# Patient Record
Sex: FEMALE | URBAN - METROPOLITAN AREA
[De-identification: names, ages, dates, MRNs, and addresses within clinical notes are randomized per-mention and may not be internally consistent; named-entity substitution may affect disease eponyms.]

---

## 2020-10-09 ENCOUNTER — APPOINTMENT (RX ONLY)
Dept: URBAN - METROPOLITAN AREA CLINIC 74 | Facility: CLINIC | Age: 50
Setting detail: DERMATOLOGY
End: 2020-10-09

## 2020-10-09 VITALS — TEMPERATURE: 97.6 F

## 2020-10-09 DIAGNOSIS — L68.0 HIRSUTISM: ICD-10-CM

## 2020-10-09 PROCEDURE — ? PRESCRIPTION

## 2020-10-09 PROCEDURE — ? TREATMENT REGIMEN

## 2020-10-09 PROCEDURE — 99202 OFFICE O/P NEW SF 15 MIN: CPT

## 2020-10-09 PROCEDURE — ? PATIENT SPECIFIC COUNSELING

## 2020-10-09 PROCEDURE — ? COUNSELING

## 2020-10-09 RX ORDER — EFLORNITHINE HYDROCHLORIDE 139 MG/G
CREAM TOPICAL BID
Qty: 1 | Refills: 3 | Status: ERX

## 2020-10-09 ASSESSMENT — LOCATION SIMPLE DESCRIPTION DERM
LOCATION SIMPLE: LEFT CHEEK
LOCATION SIMPLE: NECK
LOCATION SIMPLE: SUBMENTAL CHIN
LOCATION SIMPLE: RIGHT CHEEK

## 2020-10-09 ASSESSMENT — LOCATION DETAILED DESCRIPTION DERM
LOCATION DETAILED: LEFT INFERIOR LATERAL BUCCAL CHEEK
LOCATION DETAILED: SUBMENTAL CHIN
LOCATION DETAILED: RIGHT CENTRAL LATERAL NECK
LOCATION DETAILED: RIGHT CENTRAL MANDIBULAR CHEEK

## 2020-10-09 ASSESSMENT — LOCATION ZONE DERM
LOCATION ZONE: FACE
LOCATION ZONE: NECK

## 2020-10-09 NOTE — HPI: UNWANTED HAIR
How Did Your Unwanted Hair Appear?: sudden in onset
How Severe Is Your Unwanted Hair?: moderate
Additional History: Pt presents for unwanted hair on beard, chin, and neck x 8 mo. Pt thinks cause is due to increased % of estrogen cr back in March. Pt has tried waxing but recurs the next day. Pt would like to discuss tx options.

## 2020-10-09 NOTE — PROCEDURE: PATIENT SPECIFIC COUNSELING
- pt presents with hirsutism on jawline, chin, and neck x 8 mo; pt thinks unwanted hair is secondary to increased % E2 10mg / test 12mg cr back in March.\\n- pt tried waxing but hair reoccurs\\n- disc tx options: LHR vs Vaniqa cr vs electrolysis. R/b/se. Pt elected to try vaniqa cr first and will consider options. \\n- recommended otc HC or cold compresses for inflammation\\nRTC in 3-6 mo
Detail Level: Detailed

## 2021-01-15 ENCOUNTER — APPOINTMENT (RX ONLY)
Dept: URBAN - METROPOLITAN AREA CLINIC 73 | Facility: CLINIC | Age: 51
Setting detail: DERMATOLOGY
End: 2021-01-15

## 2021-01-15 DIAGNOSIS — L68.0 HIRSUTISM: ICD-10-CM | Status: IMPROVED

## 2021-01-15 PROCEDURE — ? COUNSELING

## 2021-01-15 PROCEDURE — ? PATIENT SPECIFIC COUNSELING

## 2021-01-15 PROCEDURE — ? TREATMENT REGIMEN

## 2021-01-15 PROCEDURE — 99212 OFFICE O/P EST SF 10 MIN: CPT

## 2021-01-15 ASSESSMENT — LOCATION DETAILED DESCRIPTION DERM
LOCATION DETAILED: LEFT INFERIOR LATERAL BUCCAL CHEEK
LOCATION DETAILED: SUBMENTAL CHIN
LOCATION DETAILED: RIGHT CENTRAL MANDIBULAR CHEEK
LOCATION DETAILED: RIGHT CENTRAL LATERAL NECK

## 2021-01-15 ASSESSMENT — LOCATION SIMPLE DESCRIPTION DERM
LOCATION SIMPLE: LEFT CHEEK
LOCATION SIMPLE: RIGHT CHEEK
LOCATION SIMPLE: NECK
LOCATION SIMPLE: SUBMENTAL CHIN

## 2021-01-15 ASSESSMENT — LOCATION ZONE DERM
LOCATION ZONE: NECK
LOCATION ZONE: FACE

## 2021-01-15 NOTE — PROCEDURE: PATIENT SPECIFIC COUNSELING
- pt reports improved with Vaniqa cream \\n- discussed LHR vs electrolysis prn future. R/b/se. Pt elected to continue vaniqa cr for now. Doesn’t need refills at this time.
Detail Level: Detailed

## 2021-11-19 ENCOUNTER — APPOINTMENT (RX ONLY)
Dept: URBAN - METROPOLITAN AREA CLINIC 73 | Facility: CLINIC | Age: 51
Setting detail: DERMATOLOGY
End: 2021-11-19

## 2021-11-19 DIAGNOSIS — L68.0 HIRSUTISM: ICD-10-CM | Status: IMPROVED

## 2021-11-19 PROCEDURE — ? COUNSELING

## 2021-11-19 PROCEDURE — 99212 OFFICE O/P EST SF 10 MIN: CPT

## 2021-11-19 PROCEDURE — ? TREATMENT REGIMEN

## 2021-11-19 PROCEDURE — ? PRESCRIPTION

## 2021-11-19 RX ORDER — PHARMACY COMPOUNDING ACCESSORY
EACH MISCELLANEOUS
Qty: 30 | Refills: 2 | Status: ERX | COMMUNITY
Start: 2021-11-19

## 2021-11-19 RX ADMIN — Medication: at 00:00

## 2021-11-19 ASSESSMENT — LOCATION SIMPLE DESCRIPTION DERM: LOCATION SIMPLE: CHIN

## 2021-11-19 ASSESSMENT — LOCATION ZONE DERM: LOCATION ZONE: FACE

## 2021-11-19 ASSESSMENT — LOCATION DETAILED DESCRIPTION DERM: LOCATION DETAILED: LEFT CHIN

## 2021-11-19 NOTE — PROCEDURE: TREATMENT REGIMEN
Continue Regimen: - spironolactone 5% cream apply twice daily to unwanted hair  \\n- OTC hydrocortisone cream prn
Detail Level: Zone

## 2021-11-19 NOTE — PROCEDURE: COUNSELING
Patient Specific Counseling (Will Not Stick From Patient To Patient): - pt content with compounded spironolactone 5% cream , sx well controlled\\n- continue prn OTC hydrocortisone cream for irritation with hair removal\\n- discussed laser intervention w pt\\n- recommend laser consult w Dr. Lozoya
Detail Level: Detailed

## 2021-11-19 NOTE — PROCEDURE: MIPS QUALITY
Quality 110: Preventive Care And Screening: Influenza Immunization: Influenza Immunization Administered during Influenza season
Additional Notes: S/p Covid vaccination
Detail Level: Detailed
Quality 130: Documentation Of Current Medications In The Medical Record: Current Medications Documented

## 2022-11-14 ENCOUNTER — APPOINTMENT (RX ONLY)
Dept: URBAN - METROPOLITAN AREA CLINIC 73 | Facility: CLINIC | Age: 52
Setting detail: DERMATOLOGY
End: 2022-11-14

## 2022-11-14 DIAGNOSIS — L68.0 HIRSUTISM: ICD-10-CM | Status: IMPROVED

## 2022-11-14 DIAGNOSIS — L28.0 LICHEN SIMPLEX CHRONICUS: ICD-10-CM

## 2022-11-14 PROCEDURE — ? COUNSELING

## 2022-11-14 PROCEDURE — ? PRESCRIPTION

## 2022-11-14 PROCEDURE — ? TREATMENT REGIMEN

## 2022-11-14 PROCEDURE — 99213 OFFICE O/P EST LOW 20 MIN: CPT

## 2022-11-14 PROCEDURE — ? PATIENT SPECIFIC COUNSELING

## 2022-11-14 RX ORDER — TRIAMCINOLONE ACETONIDE 1 MG/G
CREAM TOPICAL BID
Qty: 30 | Refills: 1 | Status: ERX | COMMUNITY
Start: 2022-11-14

## 2022-11-14 RX ORDER — PHARMACY COMPOUNDING ACCESSORY
EACH MISCELLANEOUS
Qty: 30 | Refills: 2 | Status: ERX | COMMUNITY
Start: 2022-11-14

## 2022-11-14 RX ADMIN — TRIAMCINOLONE ACETONIDE: 1 CREAM TOPICAL at 00:00

## 2022-11-14 RX ADMIN — Medication: at 00:00

## 2022-11-14 ASSESSMENT — LOCATION DETAILED DESCRIPTION DERM
LOCATION DETAILED: RIGHT PROXIMAL DORSAL FOREARM
LOCATION DETAILED: LEFT CHIN

## 2022-11-14 ASSESSMENT — LOCATION SIMPLE DESCRIPTION DERM
LOCATION SIMPLE: RIGHT FOREARM
LOCATION SIMPLE: CHIN

## 2022-11-14 ASSESSMENT — LOCATION ZONE DERM
LOCATION ZONE: ARM
LOCATION ZONE: FACE

## 2022-11-14 NOTE — PROCEDURE: PATIENT SPECIFIC COUNSELING
- Pt reports Spironolactone cream helps soften hair and helps with coarse hair\\n- Pt plucks out hair and waxes 2 weeks after \\n- tx option: referral to endocrinologist to see oral medications vs LHR \\n- Pt has an endocrinologist. Recommended to review oral tx options with endocrinologist
Detail Level: Detailed
- Pt denies friction to the area\\n- will send TAC 0.1% bid x 2 weeks which will help with inflammation and itchiness\\n- Pt to use CeraVe SA after using TAC 0.1% for the 2 weeks

## 2022-11-14 NOTE — PROCEDURE: TREATMENT REGIMEN
Continue Regimen: - spironolactone 5% cream apply twice daily to unwanted hair
Detail Level: Zone
Samples Given: CeraVe SA
Initiate Treatment: TAC cream 0.1% Apply to affected areas on R arm bid x 2 weeks